# Patient Record
Sex: FEMALE | Race: WHITE | URBAN - METROPOLITAN AREA
[De-identification: names, ages, dates, MRNs, and addresses within clinical notes are randomized per-mention and may not be internally consistent; named-entity substitution may affect disease eponyms.]

---

## 2017-04-05 ENCOUNTER — OFFICE VISIT (OUTPATIENT)
Dept: OBGYN CLINIC | Age: 27
End: 2017-04-05

## 2017-04-05 VITALS
HEIGHT: 66 IN | WEIGHT: 141 LBS | HEART RATE: 79 BPM | BODY MASS INDEX: 22.66 KG/M2 | SYSTOLIC BLOOD PRESSURE: 114 MMHG | DIASTOLIC BLOOD PRESSURE: 64 MMHG

## 2017-04-05 DIAGNOSIS — Z30.09 FAMILY PLANNING: Primary | ICD-10-CM

## 2017-04-05 RX ORDER — GABAPENTIN 300 MG/1
300 CAPSULE ORAL 3 TIMES DAILY
COMMUNITY

## 2017-04-05 NOTE — MR AVS SNAPSHOT
Visit Information Date & Time Provider Department Dept. Phone Encounter #  
 4/5/2017 11:00 AM Angel Faith, [facility] [phone] [id number] Follow-up Instructions Return in about 2 months (around 6/5/2017) for annual exam. Upcoming Health Maintenance Date Due  
 HPV AGE 9Y-34Y (1 of 3 - Female 3 Dose Series) 5/3/2003 PAP AKA CERVICAL CYTOLOGY 5/3/2013 INFLUENZA AGE 9 TO ADULT 8/1/2016 Allergies as of 4/5/2017  Review Complete On: 4/5/2017 By: Jassi Riddle LPN No Known Allergies Current Immunizations  Never Reviewed No immunizations on file. Not reviewed this visit You Were Diagnosed With   
  
 Codes Comments Family planning    -  Primary ICD-10-CM: Z30.09 
ICD-9-CM: V25.09 Vitals BP Pulse Height(growth percentile) Weight(growth percentile) LMP BMI  
 114/64 79 5' 6\" (1.676 m) 141 lb (64 kg) 03/29/2017 22.76 kg/m2 Smoking Status Never Smoker BMI and BSA Data Body Mass Index Body Surface Area  
 22.76 kg/m 2 1.73 m 2 Preferred Pharmacy Pharmacy Name Phone [id number] [phone] Your Updated Medication List  
  
   
This list is accurate as of: 4/5/17 12:23 PM.  Always use your most recent med list.  
  
  
  
  
 gabapentin 300 mg capsule Commonly known as:  NEURONTIN Take 300 mg by mouth three (3) times daily. norethindrone-e.estradiol-iron 1 mg-10 mcg (24)/10 mcg (2) Tab Commonly known as:  LO LOESTRIN FE Take 1 Tab by mouth daily. Prescriptions Sent to Pharmacy Refills  
 norethindrone-e.estradiol-iron (LO LOESTRIN FE) 1 mg-10 mcg (24)/10 mcg (2) tab 11 Sig: Take 1 Tab by mouth daily. Class: Normal  
 Pharmacy: [address] [place] [address]  #: [phone] Route: Oral  
  
We Performed the Following AMB POC URINE PREGNANCY TEST, VISUAL COLOR COMPARISON [04527 CPT(R)] Follow-up Instructions Return in about 2 months (around 6/5/2017) for annual exam.  
  
  
Patient Instructions Combination Birth Control Pills: Care Instructions Your Care Instructions Combination birth control pills are used to prevent pregnancy. They give you a regular dose of the hormones estrogen and progestin. You take a hormone pill every day to prevent pregnancy. Birth control pills come in packs. The most common type has 3 weeks of hormone pills. Some packs have sugar pills (they do not contain any hormones) for the fourth week. During that fourth no-hormone week, you have your period. After the fourth week (28 days), you start a new pack. Some birth control pills are packaged in different ways. For example, some have hormone pills for the fourth week instead of sugar pills. Taking hormones for the entire month causes you to not have periods or to have fewer periods. Others are packaged so that you have a period every 3 months. Your doctor will tell you what type of pills you have. Follow-up care is a key part of your treatment and safety. Be sure to make and go to all appointments, and call your doctor if you are having problems. It's also a good idea to know your test results and keep a list of the medicines you take. How can you care for yourself at home? How do you take the pill? · Follow your doctor's instructions about when to start taking your pills. Use backup birth control, such as a condom, or don't have intercourse for 7 days after you start your pills. · Take your pills every day, at about the same time of day. To help yourself do this, try to take them when you do something else every day, such as brushing your teeth. What if you forget to take a pill? Always read the label for specific instructions, or call your doctor. Here are some basic guidelines: · If you miss 1 hormone pill, take it as soon as you remember. Ask your doctor if you may need to use a backup birth control method, such as a condom, or not have intercourse. · If you miss 2 or more hormone pills, take one as soon as you remember you forgot them. Then read the pill label or call your doctor about instructions on how to take your missed pills. Use a backup method of birth control or don't have intercourse for 7 days. Pregnancy is more likely if you miss more than 1 pill. · If you had intercourse, you can use emergency contraception, such as the morning-after pill (Plan B). You can use emergency contraception for up to 5 days after having had intercourse, but it works best if you take it right away. What else do you need to know? · The pill has side effects. ¨ You may have very light or skipped periods. ¨ You may have bleeding between periods (spotting). This usually decreases after 3 to 4 months. ¨ You may have mood changes, less interest in sex, or weight gain. · The pill may reduce acne, heavy bleeding and cramping, and symptoms of premenstrual syndrome. · Check with your doctor before you use any other medicines, including over-the-counter medicines, vitamins, herbal products, and supplements. Birth control hormones may not work as well to prevent pregnancy when combined with other medicines. · The pill doesn't protect against sexually transmitted infection (STIs), such as herpes or HIV/AIDS. If you're not sure whether your sex partner might have an STI, use a condom to protect against disease. When should you call for help? Call your doctor now or seek immediate medical care if: 
· You have severe belly pain. · You have signs of a blood clot, such as: 
¨ Pain in your calf, back of the knee, thigh, or groin. ¨ Redness and swelling in your leg or groin. · You have blurred vision or other problems seeing. · You have a severe headache. · You have severe trouble breathing. Watch closely for changes in your health, and be sure to contact your doctor if: 
· You think you might be pregnant. · You think you may be depressed. · You think you may have been exposed to or have a sexually transmitted infection. Where can you learn more? Go to http://risa-luke.info/. Enter V332 in the search box to learn more about \"Combination Birth Control Pills: Care Instructions. \" Current as of: May 30, 2016 Content Version: 11.2 © 2309-1945 [employer].. Care instructions adapted under license by [employer] (which disclaims liability or warranty for this information). If you have questions about a medical condition or this instruction, always ask your healthcare professional. [address] any warranty or liability for your use of this information. Introducing [facility] [place] [facility]! [facility] introduces whoactually patient portal. Now you can access parts of your medical record, email your doctor's office, and request medication refills online. 1. In your internet browser, go to [email or web address] 2. Click on the First Time User? Click Here link in the Sign In box. You will see the New Member Sign Up page. 3. Enter your whoactually Access Code exactly as it appears below. You will not need to use this code after youve completed the sign-up process. If you do not sign up before the expiration date, you must request a new code. · whoactually Access Code: [email or web address] Expires: 7/4/2017 12:23 PM 
 
4. Enter the last four digits of your Social Security Number (xxxx) and Date of Birth (mm/dd/yyyy) as indicated and click Submit. You will be taken to the next sign-up page. 5. Create a whoactually ID. This will be your whoactually login ID and cannot be changed, so think of one that is secure and easy to remember. 6. Create a KeraN[employer] password. You can change your password at any time. 7. Enter your Password Reset Question and Answer. This can be used at a later time if you forget your password. 8. Enter your e-mail address. You will receive e-mail notification when new information is available in [address] 9. Click Sign Up. You can now view and download portions of your medical record. 10. Click the Download Summary menu link to download a portable copy of your medical information. If you have questions, please visit the Frequently Asked Questions section of the [employer] website. Remember, [employer] is NOT to be used for urgent needs. For medical emergencies, dial 911. Now available from your iPhone and Android! Please provide this summary of care documentation to your next provider. If you have any questions after today's visit, please call [phone].

## 2017-04-05 NOTE — PROGRESS NOTES
Subjective:   Patient presents for contraception counseling. The patient has no complaints today. The patient is sexually active. Social History: single partner, contraception - none. Recently had paragard IUD removed 2 weeks ago. IUD perforated/embedded in uterus. This was done in [place]. Pertinent past medical hstory: no history of HTN, DVT, CAD, DM, liver disease, migraines or smoking. Last pap: 3 years ago NILM  There is no problem list on file for this patient. There are no active problems to display for this patient. Current Outpatient Prescriptions   Medication Sig Dispense Refill    gabapentin (NEURONTIN) 300 mg capsule Take 300 mg by mouth three (3) times daily.  norethindrone-e.estradiol-iron (LO LOESTRIN FE) 1 mg-10 mcg (24)/10 mcg (2) tab Take 1 Tab by mouth daily. 1 Package 11     No Known Allergies  Past Medical History:   Diagnosis Date    Anorexia     Bulimia     Dysmenorrhea      Past Surgical History:   Procedure Laterality Date    HX BREAST AUGMENTATION      HX HYSTEROSCOPY      IUD removal    HX TONSILLECTOMY       History reviewed. No pertinent family history. Social History   Substance Use Topics    Smoking status: Never Smoker    Smokeless tobacco: Former User    Alcohol use Yes        GYN Review: normal menses, no pelvic pain or discharge, no breast pain or new or enlarging lumps on self exam    Additional Review of Systems  Constitutional: negative for fevers, chills, sweats and fatigue  Respiratory: negative for pleurisy/chest pain or dyspnea on exertion  Cardiovascular: negative for chest pain, irregular heart beats, tachypnea  Gastrointestinal: negative for nausea, vomiting, change in bowel habits, melena, diarrhea, constipation and abdominal pain  Genitourinary:negative for frequency, dysuria and hematuria     Objective:   LMP 3/30/17    The patient appears well, alert, oriented x 3, in no distress.     Assessment/Plan:   starting OCP (Oral Contraceptive Pills) and OCP (estrogen/progesterone), no contraindications. counseled on breast self exam and family planning choices  Discussed options for contraception with patient. Patient desires to be on OCPs. Discussed common SE including irregular bleeding, breast tenderness, nausea, HA. Discussed importance of compliance and what to do if doses skipped. Patient to start OCPs on Sunday after her next menstrual cycle. Patient to use backup contracetion within the first 7 days of OCP initiation. Denies any history of VTE, migraines with aura, HTN. All questions answered. ICD-10-CM ICD-9-CM    1. Family planning Z30.09 V25.09       AMB POC URINE PREGNANCY TEST, VISUAL COLOR COMPARISON      norethindrone-e.estradiol-iron (LO LOESTRIN FE) 1 mg-10 mcg (24)/10 mcg (2) tab   . RTO in 2 months for an annual exam.    Discussed with patient that our office will call for abnormal lab results. Normal results can be reviewed through [employer]. If patient has not received a phone call from our office or there are no results found on [employer], the patient has been instructed to call our office to follow up on results. Greater than 50% of the this 30 min visit was spent in counseling and/or coordination of care. I have verbalized the plan of care with patient. The patient was given a full opportunity to ask questions, indicated that her questions had been answered and expressed understanding.

## 2017-04-05 NOTE — PATIENT INSTRUCTIONS
Combination Birth Control Pills: Care Instructions  Your Care Instructions    Combination birth control pills are used to prevent pregnancy. They give you a regular dose of the hormones estrogen and progestin. You take a hormone pill every day to prevent pregnancy. Birth control pills come in packs. The most common type has 3 weeks of hormone pills. Some packs have sugar pills (they do not contain any hormones) for the fourth week. During that fourth no-hormone week, you have your period. After the fourth week (28 days), you start a new pack. Some birth control pills are packaged in different ways. For example, some have hormone pills for the fourth week instead of sugar pills. Taking hormones for the entire month causes you to not have periods or to have fewer periods. Others are packaged so that you have a period every 3 months. Your doctor will tell you what type of pills you have. Follow-up care is a key part of your treatment and safety. Be sure to make and go to all appointments, and call your doctor if you are having problems. It's also a good idea to know your test results and keep a list of the medicines you take. How can you care for yourself at home? How do you take the pill? · Follow your doctor's instructions about when to start taking your pills. Use backup birth control, such as a condom, or don't have intercourse for 7 days after you start your pills. · Take your pills every day, at about the same time of day. To help yourself do this, try to take them when you do something else every day, such as brushing your teeth. What if you forget to take a pill? Always read the label for specific instructions, or call your doctor. Here are some basic guidelines:  · If you miss 1 hormone pill, take it as soon as you remember. Ask your doctor if you may need to use a backup birth control method, such as a condom, or not have intercourse.   · If you miss 2 or more hormone pills, take one as soon as you remember you forgot them. Then read the pill label or call your doctor about instructions on how to take your missed pills. Use a backup method of birth control or don't have intercourse for 7 days. Pregnancy is more likely if you miss more than 1 pill. · If you had intercourse, you can use emergency contraception, such as the morning-after pill (Plan B). You can use emergency contraception for up to 5 days after having had intercourse, but it works best if you take it right away. What else do you need to know? · The pill has side effects. ¨ You may have very light or skipped periods. ¨ You may have bleeding between periods (spotting). This usually decreases after 3 to 4 months. ¨ You may have mood changes, less interest in sex, or weight gain. · The pill may reduce acne, heavy bleeding and cramping, and symptoms of premenstrual syndrome. · Check with your doctor before you use any other medicines, including over-the-counter medicines, vitamins, herbal products, and supplements. Birth control hormones may not work as well to prevent pregnancy when combined with other medicines. · The pill doesn't protect against sexually transmitted infection (STIs), such as herpes or HIV/AIDS. If you're not sure whether your sex partner might have an STI, use a condom to protect against disease. When should you call for help? Call your doctor now or seek immediate medical care if:  · You have severe belly pain. · You have signs of a blood clot, such as:  ¨ Pain in your calf, back of the knee, thigh, or groin. ¨ Redness and swelling in your leg or groin. · You have blurred vision or other problems seeing. · You have a severe headache. · You have severe trouble breathing. Watch closely for changes in your health, and be sure to contact your doctor if:  · You think you might be pregnant. · You think you may be depressed. · You think you may have been exposed to or have a sexually transmitted infection.   Where can you learn more? Go to http://risa-luke.info/. Enter N187 in the search box to learn more about \"Combination Birth Control Pills: Care Instructions. \"  Current as of: May 30, 2016  Content Version: 11.2  © 2664-3379 [employer], [employer]. Care instructions adapted under license by [employer] (which disclaims liability or warranty for this information). If you have questions about a medical condition or this instruction, always ask your healthcare professional. [place] [address] any warranty or liability for your use of this information.